# Patient Record
Sex: MALE | URBAN - METROPOLITAN AREA
[De-identification: names, ages, dates, MRNs, and addresses within clinical notes are randomized per-mention and may not be internally consistent; named-entity substitution may affect disease eponyms.]

---

## 2017-07-11 ENCOUNTER — APPOINTMENT (OUTPATIENT)
Dept: URBAN - METROPOLITAN AREA CLINIC 319 | Age: 27
Setting detail: DERMATOLOGY
End: 2017-07-11

## 2017-07-11 DIAGNOSIS — L70.0 ACNE VULGARIS: ICD-10-CM

## 2017-07-11 DIAGNOSIS — Z79.899 OTHER LONG TERM (CURRENT) DRUG THERAPY: ICD-10-CM

## 2017-07-11 PROCEDURE — OTHER ISOTRETINOIN MONITORING: OTHER

## 2017-07-11 PROCEDURE — OTHER OTHER: OTHER

## 2017-07-11 PROCEDURE — OTHER COUNSELING: OTHER

## 2017-07-11 PROCEDURE — OTHER ISOTRETINOIN INITIATION: OTHER

## 2017-07-11 PROCEDURE — 99202 OFFICE O/P NEW SF 15 MIN: CPT

## 2017-07-11 PROCEDURE — OTHER HIGH RISK MEDICATION MONITORING: OTHER

## 2017-07-11 PROCEDURE — OTHER REASSURANCE: OTHER

## 2017-07-11 PROCEDURE — OTHER COUNSELING: ISOTRETINOIN: OTHER

## 2017-07-11 PROCEDURE — OTHER TREATMENT REGIMEN: OTHER

## 2017-07-11 ASSESSMENT — LOCATION ZONE DERM
LOCATION ZONE: TRUNK
LOCATION ZONE: FACE
LOCATION ZONE: TRUNK
LOCATION ZONE: FACE

## 2017-07-11 ASSESSMENT — LOCATION SIMPLE DESCRIPTION DERM
LOCATION SIMPLE: RIGHT UPPER BACK
LOCATION SIMPLE: RIGHT CHEEK
LOCATION SIMPLE: LEFT CHEEK
LOCATION SIMPLE: RIGHT CHEEK
LOCATION SIMPLE: RIGHT UPPER BACK
LOCATION SIMPLE: LEFT CHEEK
LOCATION SIMPLE: LEFT UPPER BACK

## 2017-07-11 ASSESSMENT — LOCATION DETAILED DESCRIPTION DERM
LOCATION DETAILED: RIGHT INFERIOR MEDIAL MALAR CHEEK
LOCATION DETAILED: RIGHT CENTRAL MALAR CHEEK
LOCATION DETAILED: RIGHT MEDIAL UPPER BACK
LOCATION DETAILED: RIGHT MEDIAL UPPER BACK
LOCATION DETAILED: LEFT CENTRAL MALAR CHEEK
LOCATION DETAILED: LEFT SUPERIOR LATERAL BUCCAL CHEEK
LOCATION DETAILED: RIGHT CENTRAL MALAR CHEEK
LOCATION DETAILED: LEFT CENTRAL MALAR CHEEK
LOCATION DETAILED: LEFT SUPERIOR UPPER BACK

## 2017-07-11 NOTE — PROCEDURE: TREATMENT REGIMEN
Initiate Treatment: Fasting labs- BMp, CBC with out diff, hepatic function panel, cholesterol ,triglycerides \\nonce we get labs results reported we will call in the prescription claravis 60mg take 1  a day # 30
Otc Regimen: Wash body with Panoxyl
Detail Level: Detailed

## 2017-07-11 NOTE — PROCEDURE: OTHER
Detail Level: Simple
Note Text (......Xxx Chief Complaint.): This diagnosis correlates with the
Other (Free Text): Dr. Reyes treated patient today

## 2024-04-04 NOTE — HPI: PIMPLES (ACNE)
Yas #[id number] use for patient care assessment.  
How Severe Is Your Acne?: moderate
Is This A New Presentation, Or A Follow-Up?: Acne